# Patient Record
Sex: MALE | Race: WHITE | NOT HISPANIC OR LATINO | Employment: FULL TIME | URBAN - METROPOLITAN AREA
[De-identification: names, ages, dates, MRNs, and addresses within clinical notes are randomized per-mention and may not be internally consistent; named-entity substitution may affect disease eponyms.]

---

## 2018-02-24 ENCOUNTER — OFFICE VISIT (OUTPATIENT)
Dept: URGENT CARE | Facility: PHYSICIAN GROUP | Age: 53
End: 2018-02-24
Payer: COMMERCIAL

## 2018-02-24 ENCOUNTER — HOSPITAL ENCOUNTER (OUTPATIENT)
Facility: MEDICAL CENTER | Age: 53
End: 2018-02-24
Attending: NURSE PRACTITIONER
Payer: COMMERCIAL

## 2018-02-24 ENCOUNTER — HOSPITAL ENCOUNTER (OUTPATIENT)
Dept: LAB | Facility: MEDICAL CENTER | Age: 53
End: 2018-02-24
Attending: NURSE PRACTITIONER
Payer: COMMERCIAL

## 2018-02-24 VITALS
BODY MASS INDEX: 28.61 KG/M2 | HEART RATE: 94 BPM | WEIGHT: 178 LBS | DIASTOLIC BLOOD PRESSURE: 80 MMHG | RESPIRATION RATE: 14 BRPM | HEIGHT: 66 IN | SYSTOLIC BLOOD PRESSURE: 124 MMHG | OXYGEN SATURATION: 97 % | TEMPERATURE: 97.2 F

## 2018-02-24 DIAGNOSIS — R53.83 MALAISE AND FATIGUE: ICD-10-CM

## 2018-02-24 DIAGNOSIS — R53.81 MALAISE AND FATIGUE: ICD-10-CM

## 2018-02-24 DIAGNOSIS — R21 RASH: ICD-10-CM

## 2018-02-24 LAB
ALBUMIN SERPL BCP-MCNC: 4.8 G/DL (ref 3.2–4.9)
ALBUMIN/GLOB SERPL: 1.9 G/DL
ALP SERPL-CCNC: 38 U/L (ref 30–99)
ALT SERPL-CCNC: 27 U/L (ref 2–50)
ANION GAP SERPL CALC-SCNC: 6 MMOL/L (ref 0–11.9)
AST SERPL-CCNC: 18 U/L (ref 12–45)
BASOPHILS # BLD AUTO: 0.5 % (ref 0–1.8)
BASOPHILS # BLD: 0.04 K/UL (ref 0–0.12)
BILIRUB SERPL-MCNC: 0.5 MG/DL (ref 0.1–1.5)
BUN SERPL-MCNC: 12 MG/DL (ref 8–22)
CALCIUM SERPL-MCNC: 9.7 MG/DL (ref 8.5–10.5)
CHLORIDE SERPL-SCNC: 105 MMOL/L (ref 96–112)
CO2 SERPL-SCNC: 25 MMOL/L (ref 20–33)
CREAT SERPL-MCNC: 1.11 MG/DL (ref 0.5–1.4)
EOSINOPHIL # BLD AUTO: 0.2 K/UL (ref 0–0.51)
EOSINOPHIL NFR BLD: 2.7 % (ref 0–6.9)
ERYTHROCYTE [DISTWIDTH] IN BLOOD BY AUTOMATED COUNT: 43.8 FL (ref 35.9–50)
GLOBULIN SER CALC-MCNC: 2.5 G/DL (ref 1.9–3.5)
GLUCOSE SERPL-MCNC: 93 MG/DL (ref 65–99)
HCT VFR BLD AUTO: 47.6 % (ref 42–52)
HGB BLD-MCNC: 15.9 G/DL (ref 14–18)
IMM GRANULOCYTES # BLD AUTO: 0.02 K/UL (ref 0–0.11)
IMM GRANULOCYTES NFR BLD AUTO: 0.3 % (ref 0–0.9)
LYMPHOCYTES # BLD AUTO: 2.37 K/UL (ref 1–4.8)
LYMPHOCYTES NFR BLD: 32.1 % (ref 22–41)
MCH RBC QN AUTO: 30.8 PG (ref 27–33)
MCHC RBC AUTO-ENTMCNC: 33.4 G/DL (ref 33.7–35.3)
MCV RBC AUTO: 92.1 FL (ref 81.4–97.8)
MONOCYTES # BLD AUTO: 0.74 K/UL (ref 0–0.85)
MONOCYTES NFR BLD AUTO: 10 % (ref 0–13.4)
NEUTROPHILS # BLD AUTO: 4.02 K/UL (ref 1.82–7.42)
NEUTROPHILS NFR BLD: 54.4 % (ref 44–72)
NRBC # BLD AUTO: 0 K/UL
NRBC BLD-RTO: 0 /100 WBC
PLATELET # BLD AUTO: 210 K/UL (ref 164–446)
PMV BLD AUTO: 9.4 FL (ref 9–12.9)
POTASSIUM SERPL-SCNC: 4.1 MMOL/L (ref 3.6–5.5)
PROT SERPL-MCNC: 7.3 G/DL (ref 6–8.2)
RBC # BLD AUTO: 5.17 M/UL (ref 4.7–6.1)
SODIUM SERPL-SCNC: 136 MMOL/L (ref 135–145)
WBC # BLD AUTO: 7.4 K/UL (ref 4.8–10.8)

## 2018-02-24 PROCEDURE — 85025 COMPLETE CBC W/AUTO DIFF WBC: CPT

## 2018-02-24 PROCEDURE — 36415 COLL VENOUS BLD VENIPUNCTURE: CPT

## 2018-02-24 PROCEDURE — 86038 ANTINUCLEAR ANTIBODIES: CPT

## 2018-02-24 PROCEDURE — 80053 COMPREHEN METABOLIC PANEL: CPT

## 2018-02-24 PROCEDURE — 99204 OFFICE O/P NEW MOD 45 MIN: CPT | Performed by: NURSE PRACTITIONER

## 2018-02-24 PROCEDURE — 87070 CULTURE OTHR SPECIMN AEROBIC: CPT

## 2018-02-24 RX ORDER — TRIAMCINOLONE ACETONIDE 1 MG/G
1 OINTMENT TOPICAL 2 TIMES DAILY
Qty: 1 TUBE | Refills: 0 | Status: SHIPPED | OUTPATIENT
Start: 2018-02-24 | End: 2021-11-09

## 2018-02-24 NOTE — PROGRESS NOTES
"Chief Complaint   Patient presents with   • Rash     on both shoulder and back        HISTORY OF PRESENT ILLNESS: Patient is a 52 y.o. male who presents to urgent care today       Rash 3-4 weeks, worsening, burning and itching    Fatigue, joint pain for two weeks    No fever, chest pain,     Mild headache    No n/v/d, abd pain    No sore throat    Benadryl, helped    No recent travel        There are no active problems to display for this patient.      Allergies:Patient has no known allergies.    No current [facility]-ordered outpatient prescriptions on file.     No current [facility]-ordered facility-administered medications on file.        History reviewed. No pertinent past medical history.    Social History   Substance Use Topics   • Smoking status: Never Smoker   • Smokeless tobacco: Never Used   • Alcohol use 3.0 oz/week     5 Cans of beer per week      Comment: week       No family status information on file.   History reviewed. No pertinent family history.    ROS:  Review of Systems   Constitutional: Negative for fever, chills, weight loss, malaise, and fatigue.   HENT: Negative for ear pain, nosebleeds, congestion, sore throat and neck pain.    Eyes: Negative for vision changes.   Neuro: Negative for headache, sensory changes, weakness, seizure, LOC.   Cardiovascular: Negative for chest pain, palpitations, orthopnea and leg swelling.   Respiratory: Negative for cough, sputum production, shortness of breath and wheezing.   Gastrointestinal: Negative for abdominal pain, nausea, vomiting or diarrhea.   Genitourinary: Negative for dysuria, urgency and frequency.  Musculoskeletal: Negative for falls, neck pain, back pain, joint pain, myalgias.   Skin: Negative for rash, diaphoresis.     Exam:  Blood pressure 124/80, pulse 94, temperature 36.2 °C (97.2 °F), resp. rate 14, height 1.676 m (5' 6\"), weight 80.7 kg (178 lb), SpO2 97 %.  General: well-nourished, well-developed male in NAD  Head: normocephalic, atraumatic  Eyes: " PERRLA, no conjunctival injection, acuity grossly intact, lids normal.  Ears: normal shape and symmetry, no tenderness, no discharge. External canals are without any significant edema or erythema. Tympanic membranes are without any inflammation, no effusion. Gross auditory acuity is intact.  Nose: symmetrical without tenderness, no discharge.  Mouth/Throat: reasonable hygiene, no erythema, exudates or tonsillar enlargement.  Neck: no masses, range of motion within normal limits, no tracheal deviation. No obvious thyroid enlargement.   Lymph: no cervical adenopathy. No supraclavicular adenopathy.   Neuro: alert and oriented. Cranial nerves 1-12 grossly intact. No sensory deficit.   Cardiovascular: regular rate and rhythm. No edema.  Pulmonary: no distress. Chest is symmetrical with respiration, no wheezes, crackles, or rhonchi.   Abdomen: soft, non-tender, no guarding, no hepatosplenomegaly.  Musculoskeletal: no clubbing, appropriate muscle tone, gait is stable.  Skin: warm, dry, intact, no clubbing, no cyanosis, no rashes.   Psych: appropriate mood, affect, judgement.         Assessment/Plan:  No diagnosis found.      Supportive care, differential diagnoses, and indications for immediate follow-up discussed with patient.   Pathogenesis of diagnosis discussed including typical length and natural progression.   Instructed to return to clinic or nearest emergency department for any change in condition, further concerns, or worsening of symptoms.  Patient states understanding of the plan of care and discharge instructions.  Instructed to make an appointment, for follow up, with their primary care provider.        Please note that this dictation was created using voice recognition software. I have made every reasonable attempt to correct obvious errors, but I expect that there are errors of grammar and possibly content that I did not discover before finalizing the note.      SHYLA Perry.

## 2018-02-25 NOTE — PROGRESS NOTES
Chief Complaint   Patient presents with   • Rash     on both shoulder and back        HISTORY OF PRESENT ILLNESS: Patient is a 52 y.o. male who presents to urgent care today with complaints of a rash. States he first noticed the rash four weeks ago and has been slowly spreading. The rash is located on his back and shoulders. Described as burning and pruritic. He admits to generalized malaise, fatigue, mild headache, and joint pain for the past two weeks. States he otherwise feels well and denies cough, abdominal pain, fever, chest pain, changes in urination, shortness of breath, nausea, diarrhea, sore throat, runny nose, ear pain, congestion. He lives at home with his son who is asymptomatic. He denies recent travel, changes in medication. Denies previous history of the same. He has tried benadryl which has helped with itch relief.       There are no active problems to display for this patient.      Allergies:Patient has no known allergies.    No current [facility]-ordered outpatient prescriptions on file.     No current [facility]-ordered facility-administered medications on file.        History reviewed. No pertinent past medical history.    Social History   Substance Use Topics   • Smoking status: Never Smoker   • Smokeless tobacco: Never Used   • Alcohol use 3.0 oz/week     5 Cans of beer per week      Comment: week       No family status information on file.   History reviewed. No pertinent family history.    ROS:  Review of Systems   Constitutional: Positive for malaise, fatigue. Negative for fever, chills, weight loss.   HENT: Negative for ear pain, nosebleeds, congestion, sore throat and neck pain.    Eyes: Negative for vision changes.   Neuro: Positive for mild headache. Negative for sensory changes, weakness, seizure, LOC.   Cardiovascular: Negative for chest pain, palpitations, orthopnea and leg swelling.   Respiratory: Negative for cough, sputum production, shortness of breath and wheezing.   Gastrointestinal:  "Negative for abdominal pain, nausea, vomiting or diarrhea.   Genitourinary: Negative for dysuria, urgency and frequency.  Musculoskeletal: Positive for joint pain, myalgias. Negative for falls, neck pain, back pain.   Skin: Positive for rash. Negative for diaphoresis.     Exam:  Blood pressure 124/80, pulse 94, temperature 36.2 °C (97.2 °F), resp. rate 14, height 1.676 m (5' 6\"), weight 80.7 kg (178 lb), SpO2 97 %.  General: well-nourished, well-developed male in NAD  Head: normocephalic, atraumatic  Eyes: PERRLA, no conjunctival injection, acuity grossly intact, lids normal.  Ears: normal shape and symmetry, no tenderness, no discharge. External canals are without any significant edema or erythema. Tympanic membranes are without any inflammation, no effusion. Gross auditory acuity is intact.  Nose: symmetrical without tenderness, no discharge.  Mouth/Throat: reasonable hygiene, no erythema, exudates or tonsillar enlargement.  Neck: no masses, range of motion within normal limits, no tracheal deviation. No obvious thyroid enlargement.   Lymph: no cervical adenopathy. No supraclavicular adenopathy.   Neuro: alert and oriented. Cranial nerves 1-12 grossly intact. No sensory deficit.   Cardiovascular: regular rate and rhythm. No edema.  Pulmonary: no distress. Chest is symmetrical with respiration, no wheezes, crackles, or rhonchi.   Musculoskeletal: no clubbing, appropriate muscle tone, gait is stable.  Skin: warm, dry, intact, no clubbing, no cyanosis. There are approximately 20 small, approx 2mm to 0.5cm, circular and oval shaped plaque and scaly flesh colored lesions to back and shoulders.   Psych: appropriate mood, affect, judgement.         Assessment/Plan:  1. Rash  CBC WITH DIFFERENTIAL    COMP METABOLIC PANEL    ANTI-NUCLEAR ANTIBODY SERUM    URINE CULTURE(NEW)    CHLAMYDIA/GC PCR URINE OR SWAB    triamcinolone acetonide (KENALOG) 0.1 % Ointment    CULTURE THROAT   2. Malaise and fatigue  CBC WITH DIFFERENTIAL "    COMP METABOLIC PANEL    ANTI-NUCLEAR ANTIBODY SERUM    URINE CULTURE(NEW)    CHLAMYDIA/GC PCR URINE OR SWAB    CULTURE THROAT         Kenalog for symptom relief of rash. Will run a CBC, CMP, JATIN, urine and throat culture, and STD screening due to his associated malaise, fatigue, and joint pain.   Supportive care, differential diagnoses, and indications for immediate follow-up discussed with patient.   Pathogenesis of diagnosis discussed including typical length and natural progression.   Instructed to return to clinic or nearest emergency department for any change in condition, further concerns, or worsening of symptoms.  Patient states understanding of the plan of care and discharge instructions.  Instructed to make an appointment, for follow up, with his primary care provider.        Please note that this dictation was created using voice recognition software. I have made every reasonable attempt to correct obvious errors, but I expect that there are errors of grammar and possibly content that I did not discover before finalizing the note.      SHYLA Perry.

## 2018-02-26 ENCOUNTER — HOSPITAL ENCOUNTER (OUTPATIENT)
Facility: MEDICAL CENTER | Age: 53
End: 2018-02-26
Attending: NURSE PRACTITIONER
Payer: COMMERCIAL

## 2018-02-26 LAB
BACTERIA SPEC RESP CULT: NORMAL
SIGNIFICANT IND 70042: NORMAL
SITE SITE: NORMAL
SOURCE SOURCE: NORMAL

## 2018-02-26 PROCEDURE — 87591 N.GONORRHOEAE DNA AMP PROB: CPT

## 2018-02-26 PROCEDURE — 87491 CHLMYD TRACH DNA AMP PROBE: CPT

## 2018-02-26 PROCEDURE — 87086 URINE CULTURE/COLONY COUNT: CPT

## 2018-02-27 LAB — NUCLEAR IGG SER QL IA: NORMAL

## 2018-02-28 LAB
BACTERIA UR CULT: NORMAL
SIGNIFICANT IND 70042: NORMAL
SITE SITE: NORMAL
SOURCE SOURCE: NORMAL

## 2018-03-01 LAB
C TRACH DNA SPEC QL NAA+PROBE: NEGATIVE
N GONORRHOEA DNA SPEC QL NAA+PROBE: NEGATIVE
SPECIMEN SOURCE: NORMAL

## 2018-03-28 ENCOUNTER — OFFICE VISIT (OUTPATIENT)
Dept: MEDICAL GROUP | Facility: PHYSICIAN GROUP | Age: 53
End: 2018-03-28
Payer: COMMERCIAL

## 2018-03-28 VITALS
HEIGHT: 66 IN | DIASTOLIC BLOOD PRESSURE: 80 MMHG | OXYGEN SATURATION: 93 % | WEIGHT: 192 LBS | SYSTOLIC BLOOD PRESSURE: 116 MMHG | RESPIRATION RATE: 14 BRPM | HEART RATE: 90 BPM | TEMPERATURE: 99.5 F | BODY MASS INDEX: 30.86 KG/M2

## 2018-03-28 DIAGNOSIS — M89.242: ICD-10-CM

## 2018-03-28 DIAGNOSIS — Z88.9 H/O SEASONAL ALLERGIES: ICD-10-CM

## 2018-03-28 DIAGNOSIS — R21 RASH: ICD-10-CM

## 2018-03-28 DIAGNOSIS — Z12.11 SCREEN FOR COLON CANCER: ICD-10-CM

## 2018-03-28 PROCEDURE — 99214 OFFICE O/P EST MOD 30 MIN: CPT | Performed by: NURSE PRACTITIONER

## 2018-03-28 ASSESSMENT — PATIENT HEALTH QUESTIONNAIRE - PHQ9: CLINICAL INTERPRETATION OF PHQ2 SCORE: 0

## 2018-03-29 NOTE — ASSESSMENT & PLAN NOTE
Patient reports a growth on 4th finger knuckle after hitting is hard several months ago.  Sometimes painful, non-moveable.  Would like an xray.  CSM to fingers intact.

## 2018-03-29 NOTE — ASSESSMENT & PLAN NOTE
Reports ongoing eczema when stressed.  Using kenalog ointment with good results.  Currently has a small outbreak on inner arms.

## 2018-03-29 NOTE — PROGRESS NOTES
"Chief Complaint   Patient presents with   • Establish Care   • Finger Pain     lump on right ring finger    • Rash       Subjective:   Jack Singh is a 52 y.o. male here to establish care  Today and for evaluation and management of:    Rash  Reports ongoing eczema when stressed.  Using kenalog ointment with good results.  Currently has a small outbreak on inner arms.      H/O seasonal allergies  States his allergies have responded well to flonase nasal spray.  Currently using daily.     Other disorders of bone development and growth, left hand  Patient reports a growth on 4th finger knuckle after hitting is hard several months ago.  Sometimes painful, non-moveable.  Would like an xray.  CSM to fingers intact.            Current medicines (including changes today)  Current Outpatient Prescriptions   Medication Sig Dispense Refill   • triamcinolone acetonide (KENALOG) 0.1 % Ointment Apply 1 Application to affected area(s) 2 times a day. 1 Tube 0     No current facility-administered medications for this visit.      He  has a past medical history of Allergy and Eczema.    ROS as stated in hpi  No chest pain, no shortness of breath, no abdominal pain       Objective:     Blood pressure 116/80, pulse 90, temperature 37.5 °C (99.5 °F), resp. rate 14, height 1.676 m (5' 6\"), weight 87.1 kg (192 lb), SpO2 93 %. Body mass index is 30.99 kg/m².   Physical Exam:  Constitutional: Alert, no distress.  Skin: Warm, dry, good turgor,no cyanosis,small eczematous rash noted on inner right arm.   Eye: Equal, round and reactive, conjunctiva clear, lids normal.  Ears: No tenderness, no discharge.  External canals are without any significant edema or erythema. Gross auditory acuity is intact.  Nose: symmetrical without tenderness, no discharge.  Mouth/Throat: lips without lesion.  Oropharynx clear.    Neck: Trachea midline, no masses, no obvious thyroid enlargement.. No cervical or supraclavicular lymphadenopathy. Range of motion " within normal limits.  Neuro: Cranial nerves 2-12 grossly intact.  No sensory deficit.  Respiratory: Unlabored respiratory effort, lungs clear to auscultation, no wheezes, no ronchi.  Cardiovascular: Normal S1, S2, no murmur, no edema.  MSK:  Right 4th finger pip joint with firm nodule noted.  No redness, slightly tender to palpation.   Psych: Alert and oriented x3, normal affect and mood and judgement.        Assessment and Plan:   The following treatment plan was discussed    1. Rash  This is anew problem to me.  Chronic.  Ongoing.  Stable.  Continue with Triamcinolone ointment prn.  Monitor and follow.     2. H/O seasonal allergies  This is a new problem to me.  Chronic. Ongoing. Stable.  Flonase daily as needed.      3. Screen for colon cancer  Due for colon cancer screening.  Referral placed.  Monitor results.   - REFERRAL TO GI FOR COLONOSCOPY    4. Other disorders of bone development and growth, left hand  This is a new problem to me.  Acute.  Unknown etiology, may represent a scar tissue formation after trauma to the area.  Xray ordered.  Monitor results.  - DX-HAND 2- RIGHT; Future      Followup: Return in about 1 year (around 3/28/2019) for Annual.

## 2018-03-31 ENCOUNTER — HOSPITAL ENCOUNTER (OUTPATIENT)
Dept: RADIOLOGY | Facility: MEDICAL CENTER | Age: 53
End: 2018-03-31
Attending: NURSE PRACTITIONER
Payer: COMMERCIAL

## 2018-03-31 DIAGNOSIS — M89.242: ICD-10-CM

## 2018-03-31 PROCEDURE — 73130 X-RAY EXAM OF HAND: CPT | Mod: RT

## 2018-04-02 ENCOUNTER — TELEPHONE (OUTPATIENT)
Dept: MEDICAL GROUP | Facility: PHYSICIAN GROUP | Age: 53
End: 2018-04-02

## 2018-04-02 NOTE — TELEPHONE ENCOUNTER
----- Message from ZACH Medley sent at 4/2/2018  9:11 AM PDT -----  Please let patient know that the right hand xray was negative for any bony/joint abnormality.  No soft tissue abnormality was noted.  ZACH Medley

## 2018-04-02 NOTE — LETTER
April 2, 2018         Jack Singh  [address] [place] [address]        Dear Jack CAPONE:      Below are the results from your recent visit:    Please let patient know that the right hand xray was negative for any bony/joint abnormality.  No soft tissue abnormality was noted.  ZACH Medley    Resulted Orders   DX-HAND 3+ RIGHT    Narrative    3/31/2018 9:35 AM    HISTORY/REASON FOR EXAM:  Right hand pain in the 4th PIP joint region..      TECHNIQUE/EXAM DESCRIPTION AND NUMBER OF VIEWS:  3 views of the RIGHT hand.    COMPARISON: None    FINDINGS:  No fracture, dislocation, or acute joint abnormality is present.  No osteophytic spurring or area of exostosis is identified in the region of the 4th PIP joint.  No soft tissue abnormality is identified.      Impression    Negative RIGHT hand series.     If you have any questions or concerns, please don't hesitate to call.    Electronically Signed

## 2019-02-14 ENCOUNTER — OFFICE VISIT (OUTPATIENT)
Dept: MEDICAL GROUP | Facility: PHYSICIAN GROUP | Age: 54
End: 2019-02-14
Payer: COMMERCIAL

## 2019-02-14 VITALS
OXYGEN SATURATION: 96 % | HEART RATE: 71 BPM | WEIGHT: 188 LBS | BODY MASS INDEX: 30.22 KG/M2 | HEIGHT: 66 IN | SYSTOLIC BLOOD PRESSURE: 116 MMHG | TEMPERATURE: 98.2 F | DIASTOLIC BLOOD PRESSURE: 70 MMHG

## 2019-02-14 DIAGNOSIS — K43.9 VENTRAL HERNIA WITHOUT OBSTRUCTION OR GANGRENE: ICD-10-CM

## 2019-02-14 DIAGNOSIS — Z00.00 PREVENTATIVE HEALTH CARE: ICD-10-CM

## 2019-02-14 DIAGNOSIS — M25.511 CHRONIC RIGHT SHOULDER PAIN: ICD-10-CM

## 2019-02-14 DIAGNOSIS — G89.29 CHRONIC RIGHT SHOULDER PAIN: ICD-10-CM

## 2019-02-14 PROBLEM — R21 RASH: Status: RESOLVED | Noted: 2018-03-28 | Resolved: 2019-02-14

## 2019-02-14 PROCEDURE — 99386 PREV VISIT NEW AGE 40-64: CPT | Performed by: NURSE PRACTITIONER

## 2019-02-14 ASSESSMENT — PATIENT HEALTH QUESTIONNAIRE - PHQ9: CLINICAL INTERPRETATION OF PHQ2 SCORE: 0

## 2019-02-15 ENCOUNTER — HOSPITAL ENCOUNTER (OUTPATIENT)
Dept: LAB | Facility: MEDICAL CENTER | Age: 54
End: 2019-02-15
Attending: NURSE PRACTITIONER
Payer: COMMERCIAL

## 2019-02-15 ENCOUNTER — HOSPITAL ENCOUNTER (OUTPATIENT)
Dept: RADIOLOGY | Facility: MEDICAL CENTER | Age: 54
End: 2019-02-15
Attending: NURSE PRACTITIONER
Payer: COMMERCIAL

## 2019-02-15 DIAGNOSIS — Z00.00 PREVENTATIVE HEALTH CARE: ICD-10-CM

## 2019-02-15 DIAGNOSIS — M25.511 CHRONIC RIGHT SHOULDER PAIN: ICD-10-CM

## 2019-02-15 DIAGNOSIS — G89.29 CHRONIC RIGHT SHOULDER PAIN: ICD-10-CM

## 2019-02-15 LAB
ALBUMIN SERPL BCP-MCNC: 4.7 G/DL (ref 3.2–4.9)
ALBUMIN/GLOB SERPL: 1.6 G/DL
ALP SERPL-CCNC: 39 U/L (ref 30–99)
ALT SERPL-CCNC: 22 U/L (ref 2–50)
ANION GAP SERPL CALC-SCNC: 6 MMOL/L (ref 0–11.9)
AST SERPL-CCNC: 17 U/L (ref 12–45)
BASOPHILS # BLD AUTO: 0.8 % (ref 0–1.8)
BASOPHILS # BLD: 0.06 K/UL (ref 0–0.12)
BILIRUB SERPL-MCNC: 0.4 MG/DL (ref 0.1–1.5)
BUN SERPL-MCNC: 15 MG/DL (ref 8–22)
CALCIUM SERPL-MCNC: 9.7 MG/DL (ref 8.5–10.5)
CHLORIDE SERPL-SCNC: 102 MMOL/L (ref 96–112)
CHOLEST SERPL-MCNC: 193 MG/DL (ref 100–199)
CO2 SERPL-SCNC: 28 MMOL/L (ref 20–33)
CREAT SERPL-MCNC: 1.08 MG/DL (ref 0.5–1.4)
EOSINOPHIL # BLD AUTO: 0.16 K/UL (ref 0–0.51)
EOSINOPHIL NFR BLD: 2.1 % (ref 0–6.9)
ERYTHROCYTE [DISTWIDTH] IN BLOOD BY AUTOMATED COUNT: 47.3 FL (ref 35.9–50)
GLOBULIN SER CALC-MCNC: 3 G/DL (ref 1.9–3.5)
GLUCOSE SERPL-MCNC: 92 MG/DL (ref 65–99)
HCT VFR BLD AUTO: 49.4 % (ref 42–52)
HDLC SERPL-MCNC: 47 MG/DL
HGB BLD-MCNC: 16 G/DL (ref 14–18)
IMM GRANULOCYTES # BLD AUTO: 0.03 K/UL (ref 0–0.11)
IMM GRANULOCYTES NFR BLD AUTO: 0.4 % (ref 0–0.9)
LDLC SERPL CALC-MCNC: 115 MG/DL
LYMPHOCYTES # BLD AUTO: 2.37 K/UL (ref 1–4.8)
LYMPHOCYTES NFR BLD: 31.1 % (ref 22–41)
MCH RBC QN AUTO: 31 PG (ref 27–33)
MCHC RBC AUTO-ENTMCNC: 32.4 G/DL (ref 33.7–35.3)
MCV RBC AUTO: 95.7 FL (ref 81.4–97.8)
MONOCYTES # BLD AUTO: 0.63 K/UL (ref 0–0.85)
MONOCYTES NFR BLD AUTO: 8.3 % (ref 0–13.4)
NEUTROPHILS # BLD AUTO: 4.37 K/UL (ref 1.82–7.42)
NEUTROPHILS NFR BLD: 57.3 % (ref 44–72)
NRBC # BLD AUTO: 0 K/UL
NRBC BLD-RTO: 0 /100 WBC
PLATELET # BLD AUTO: 234 K/UL (ref 164–446)
PMV BLD AUTO: 9.3 FL (ref 9–12.9)
POTASSIUM SERPL-SCNC: 4.3 MMOL/L (ref 3.6–5.5)
PROT SERPL-MCNC: 7.7 G/DL (ref 6–8.2)
RBC # BLD AUTO: 5.16 M/UL (ref 4.7–6.1)
SODIUM SERPL-SCNC: 136 MMOL/L (ref 135–145)
TRIGL SERPL-MCNC: 153 MG/DL (ref 0–149)
WBC # BLD AUTO: 7.6 K/UL (ref 4.8–10.8)

## 2019-02-15 PROCEDURE — 80053 COMPREHEN METABOLIC PANEL: CPT

## 2019-02-15 PROCEDURE — 36415 COLL VENOUS BLD VENIPUNCTURE: CPT

## 2019-02-15 PROCEDURE — 80061 LIPID PANEL: CPT

## 2019-02-15 PROCEDURE — 73030 X-RAY EXAM OF SHOULDER: CPT | Mod: RT

## 2019-02-15 PROCEDURE — 85025 COMPLETE CBC W/AUTO DIFF WBC: CPT

## 2019-02-15 NOTE — PROGRESS NOTES
"Chief Complaint   Patient presents with   • Annual Exam   • Shoulder Pain     Right       Subjective:   Jack Singh is a 53 y.o. male here today for preventative exam    Chronic right shoulder pain  Reports increasing pain in right shoulder over the past several months.  Worsens with cold.  Rom causes pain with abduction. Hurts to sleep on that side.  Dominant arm. Has used icy hot and ibuprofen with some iimprovement.     Ventral hernia without obstruction or gangrene  This is anew reported problem.  States it has been there for many years.  Requesting referral for evaluation of this hernia.  Only painful with heavy lifting.          Current medicines (including changes today)  Current Outpatient Prescriptions   Medication Sig Dispense Refill   • triamcinolone acetonide (KENALOG) 0.1 % Ointment Apply 1 Application to affected area(s) 2 times a day. (Patient not taking: Reported on 2/14/2019) 1 Tube 0     No current facility-administered medications for this visit.      He  has a past medical history of Allergy and Eczema.    ROS as stated in hpi  No chest pain, no shortness of breath, no abdominal pain       Objective:     Blood pressure 116/70, pulse 71, temperature 36.8 °C (98.2 °F), temperature source Temporal, height 1.676 m (5' 6\"), weight 85.3 kg (188 lb), SpO2 96 %. Body mass index is 30.34 kg/m².   Physical Exam:  Constitutional: Alert, no distress.  Skin: Warm, dry, good turgor,no cyanosis, no rashes in visible areas.  Eye: Equal, round and reactive, conjunctiva clear, lids normal.  Ears: No tenderness, no discharge.  External canals are without any significant edema or erythema.  Tympanic membranes are without any inflammation, no effusion.  Gross auditory acuity is intact.  Nose: symmetrical without tenderness, no discharge.  Mouth/Throat: lips without lesion.  Oropharynx clear.  Throat without erythema, exudates or tonsillar enlargement.  Neck: Trachea midline, no masses, no obvious thyroid " enlargement.. No cervical or supraclavicular lymphadenopathy. Range of motion within normal limits.  Neuro: Cranial nerves 2-12 grossly intact.  No sensory deficit.  Respiratory: Unlabored respiratory effort, lungs clear to auscultation, no wheezes, no ronchi.  Cardiovascular: Normal S1, S2, no murmur, no edema.  Abdomen: Soft, non-tender, reducible ventral hernia midline, no redness or pain with palpation. , no guarding,  no hepatosplenomegaly.  MSK:  Right shoulder with pain in posterior aspect of joint. No swelling brusing or deformity noted.  Negative tin can sign.  Decrease abduction ROM  Psych: Alert and oriented x3, normal affect and mood and judgement.        Assessment and Plan:   The following treatment plan was discussed    1. Chronic right shoulder pain  This is a new problem to me.  Chronic, ongoing, worsening with winter weather.  Suspect arthritis.  Will obtain Xray.  Discussed possible shoulder injection, physical therapy or referral to orthopedics depending on x-ray findings.  Continue with heat ice ibuprofen and icy hot.  Monitor and follow results.  - DX-SHOULDER 2+ RIGHT; Future    2. Ventral hernia without obstruction or gangrene  This is a new problem to me.  Chronic ongoing issue.  Patient would like a referral to general surgery for consultation.  On exam the hernia is stable at this time.  Referral placed.  Monitor and follow.  - REFERRAL TO GENERAL SURGERY    3. Preventative health care  Here for annual preventative exam.  Due for lab work.  Orders provided.  Monitor and follow.  Patient is up-to-date on colonoscopy.  He is not interested in any vaccines today including influenza.  - CBC WITH DIFFERENTIAL; Future  - Comp Metabolic Panel; Future  - Lipid Profile; Future      Followup: Return in about 1 year (around 2/14/2020), or if symptoms worsen or fail to improve.         Educated in proper administration of medication(s) ordered today including safety, possible SE, risks, benefits,  rationale and alternatives to therapy.     Please note that this dictation was created using voice recognition software. I have made every reasonable attempt to correct obvious errors, but I expect that there are errors of grammar and possibly content that I did not discover before finalizing the note.

## 2019-02-15 NOTE — ASSESSMENT & PLAN NOTE
Reports increasing pain in right shoulder over the past several months.  Worsens with cold.  Rom causes pain with abduction. Hurts to sleep on that side.  Dominant arm. Has used icy hot and ibuprofen with some iimprovement.

## 2019-02-15 NOTE — ASSESSMENT & PLAN NOTE
This is anew reported problem.  States it has been there for many years.  Requesting referral for evaluation of this hernia.  Only painful with heavy lifting.

## 2019-02-19 ENCOUNTER — TELEPHONE (OUTPATIENT)
Dept: MEDICAL GROUP | Facility: PHYSICIAN GROUP | Age: 54
End: 2019-02-19

## 2019-02-19 NOTE — TELEPHONE ENCOUNTER
----- Message from ZACH Medley sent at 2/19/2019  9:06 AM PST -----  Jack  Labs are back.  Kidney, liver and blood counts all in the normal range.  Cholesterol is normal.  Triglycerides are just slightly elevated.  These are the fatty sugars in our bloodstream.  Watching sugars, treats and fast foods will help bring this down.  Adding more veggies to our diet is also a good idea!.  All in all, things look good.  ZACH Medley

## 2019-02-19 NOTE — LETTER
February 19, 2019         Jack Singh  [address] [place] [address]        Dear Jack CAPONE:      Below are the results from your recent visit:    Labs are back.  Kidney, liver and blood counts all in the normal range.  Cholesterol is normal.  Triglycerides are just slightly elevated.  These are the fatty sugars in our bloodstream.  Watching sugars, treats and fast foods will help bring this down.  Adding more veggies to our diet is also a good idea!.  All in all, things look good.   ZACH Medley     Resulted Orders   CBC WITH DIFFERENTIAL   Result Value Ref Range    WBC 7.6 4.8 - 10.8 K/uL    RBC 5.16 4.70 - 6.10 M/uL    Hemoglobin 16.0 14.0 - 18.0 g/dL    Hematocrit 49.4 42.0 - 52.0 %    MCV 95.7 81.4 - 97.8 fL    MCH 31.0 27.0 - 33.0 pg    MCHC 32.4 (L) 33.7 - 35.3 g/dL    RDW 47.3 35.9 - 50.0 fL    Platelet Count 234 164 - 446 K/uL    MPV 9.3 9.0 - 12.9 fL    Neutrophils-Polys 57.30 44.00 - 72.00 %    Lymphocytes 31.10 22.00 - 41.00 %    Monocytes 8.30 0.00 - 13.40 %    Eosinophils 2.10 0.00 - 6.90 %    Basophils 0.80 0.00 - 1.80 %    Immature Granulocytes 0.40 0.00 - 0.90 %    Nucleated RBC 0.00 /100 WBC    Neutrophils (Absolute) 4.37 1.82 - 7.42 K/uL      Comment:      Includes immature neutrophils, if present.    Lymphs (Absolute) 2.37 1.00 - 4.80 K/uL    Monos (Absolute) 0.63 0.00 - 0.85 K/uL    Eos (Absolute) 0.16 0.00 - 0.51 K/uL    Baso (Absolute) 0.06 0.00 - 0.12 K/uL    Immature Granulocytes (abs) 0.03 0.00 - 0.11 K/uL    NRBC (Absolute) 0.00 K/uL    Narrative    Fast 8-10 hours, OK to drink water as needed during fast,  take medications per your provider's instructions.   Comp Metabolic Panel   Result Value Ref Range    Sodium 136 135 - 145 mmol/L    Potassium 4.3 3.6 - 5.5 mmol/L    Chloride 102 96 - 112 mmol/L    Co2 28 20 - 33 mmol/L    Anion Gap 6.0 0.0 - 11.9    Glucose 92 65 - 99 mg/dL    Bun 15 8 - 22 mg/dL    Creatinine 1.08 0.50 - 1.40 mg/dL    Calcium 9.7 8.5 - 10.5 mg/dL    AST(SGOT) 17 12 - 45 U/L    ALT(SGPT) 22 2 - 50 U/L    Alkaline Phosphatase 39 30 - 99 U/L    Total Bilirubin 0.4 0.1 - 1.5 mg/dL    Albumin 4.7 3.2 - 4.9 g/dL    Total Protein 7.7 6.0 - 8.2 g/dL    Globulin 3.0 1.9 - 3.5 g/dL    A-G Ratio 1.6 g/dL    Narrative    Fast 8-10 hours, OK to drink water as needed during fast,  take medications per your provider's instructions.   Lipid Profile   Result Value Ref Range    Cholesterol,Tot 193 100 - 199 mg/dL    Triglycerides 153 (H) 0 - 149 mg/dL    HDL 47 >=40 mg/dL     (H) <100 mg/dL    Narrative    Fast 8-10 hours, OK to drink water as needed during fast,  take medications per your provider's instructions.   ESTIMATED GFR   Result Value Ref Range    GFR If African American >60 >60 mL/min/1.73 m 2    GFR If Non African American >60 >60 mL/min/1.73 m 2    Narrative    Fast 8-10 hours, OK to drink water as needed during fast,  take medications per your provider's instructions.     If you have any questions or concerns, please don't hesitate to call.    Electronically Signed

## 2019-12-11 ENCOUNTER — NON-PROVIDER VISIT (OUTPATIENT)
Dept: MEDICAL GROUP | Facility: PHYSICIAN GROUP | Age: 54
End: 2019-12-11
Payer: COMMERCIAL

## 2019-12-11 DIAGNOSIS — Z23 NEED FOR VACCINATION: ICD-10-CM

## 2019-12-11 PROCEDURE — 90686 IIV4 VACC NO PRSV 0.5 ML IM: CPT | Performed by: NURSE PRACTITIONER

## 2019-12-11 PROCEDURE — 90715 TDAP VACCINE 7 YRS/> IM: CPT | Performed by: NURSE PRACTITIONER

## 2019-12-11 PROCEDURE — 90472 IMMUNIZATION ADMIN EACH ADD: CPT | Performed by: NURSE PRACTITIONER

## 2019-12-11 PROCEDURE — 90471 IMMUNIZATION ADMIN: CPT | Performed by: NURSE PRACTITIONER

## 2019-12-12 NOTE — PROGRESS NOTES
"Jack Singh is a 54 y.o. male here for a non-provider visit for:   FLU    Reason for immunization: Annual Flu Vaccine  Immunization records indicate need for vaccine: Yes, confirmed with Epic  Minimum interval has been met for this vaccine: Yes  ABN completed: Not Indicated    Order and dose verified by: JANA  VIS Dated  8/15/19 was given to patient: Yes  All IAC Questionnaire questions were answered \"No.\"    Patient tolerated injection and no adverse effects were observed or reported: Yes    Pt scheduled for next dose in series: Not Indicated    Jack Singh is a 54 y.o. male here for a non-provider visit for:   TDAP    Reason for immunization: Overdue/Provider Recommended  Immunization records indicate need for vaccine: Yes, confirmed with Epic  Minimum interval has been met for this vaccine: Yes  ABN completed: Not Indicated    Order and dose verified by: JANA  VIS Dated  2/24/2015 was given to patient: Yes  All IAC Questionnaire questions were answered \"No.\"    Patient tolerated injection and no adverse effects were observed or reported: Yes    Pt scheduled for next dose in series: Not Indicated  "

## 2020-11-11 ENCOUNTER — NON-PROVIDER VISIT (OUTPATIENT)
Dept: URGENT CARE | Facility: PHYSICIAN GROUP | Age: 55
End: 2020-11-11
Payer: COMMERCIAL

## 2020-11-11 DIAGNOSIS — Z23 NEED FOR VACCINATION: ICD-10-CM

## 2020-11-11 PROCEDURE — 90471 IMMUNIZATION ADMIN: CPT | Performed by: FAMILY MEDICINE

## 2020-11-11 PROCEDURE — 90686 IIV4 VACC NO PRSV 0.5 ML IM: CPT | Performed by: FAMILY MEDICINE

## 2021-11-09 ENCOUNTER — HOSPITAL ENCOUNTER (EMERGENCY)
Facility: MEDICAL CENTER | Age: 56
End: 2021-11-09
Attending: EMERGENCY MEDICINE | Admitting: EMERGENCY MEDICINE
Payer: COMMERCIAL

## 2021-11-09 ENCOUNTER — APPOINTMENT (OUTPATIENT)
Dept: RADIOLOGY | Facility: MEDICAL CENTER | Age: 56
End: 2021-11-09
Attending: EMERGENCY MEDICINE
Payer: COMMERCIAL

## 2021-11-09 VITALS
RESPIRATION RATE: 15 BRPM | WEIGHT: 191.8 LBS | DIASTOLIC BLOOD PRESSURE: 70 MMHG | TEMPERATURE: 98 F | SYSTOLIC BLOOD PRESSURE: 130 MMHG | OXYGEN SATURATION: 95 % | BODY MASS INDEX: 30.96 KG/M2 | HEART RATE: 85 BPM

## 2021-11-09 DIAGNOSIS — M79.89 LEG SWELLING: ICD-10-CM

## 2021-11-09 PROCEDURE — 93971 EXTREMITY STUDY: CPT | Mod: RT

## 2021-11-09 PROCEDURE — 99283 EMERGENCY DEPT VISIT LOW MDM: CPT

## 2021-11-09 PROCEDURE — 93971 EXTREMITY STUDY: CPT | Mod: 26,RT | Performed by: INTERNAL MEDICINE

## 2021-11-09 ASSESSMENT — LIFESTYLE VARIABLES: DO YOU DRINK ALCOHOL: NO

## 2021-11-09 NOTE — ED PROVIDER NOTES
ED Provider Note    CHIEF COMPLAINT  Chief Complaint   Patient presents with   • Leg Swelling     right        HPI  Jack Singh is a 56 y.o. male who presents stating that he has no significant past medical history, he does state however, that he had a right lower extremity DVT previously.  However, he reports that he was only on blood thinners while he was in the hospital and did not take any blood thinner after being discharged.  The patient reports leg pain and swelling since last night.  He denies any trauma to the leg.  He denies any chest pain or shortness of breath.  He describes the pain is moderate.  Patient denies having previous Covid infection.    REVIEW OF SYSTEMS  See HPI for further details. All other systems are negative.     PAST MEDICAL HISTORY   has a past medical history of Allergy and Eczema.    SOCIAL HISTORY  Social History     Tobacco Use   • Smoking status: Never Smoker   • Smokeless tobacco: Never Used   Substance and Sexual Activity   • Alcohol use: Yes     Alcohol/week: 6.0 - 7.2 oz     Types: 10 - 12 Cans of beer per week     Comment: week   • Drug use: No   • Sexual activity: Yes     Partners: Female       SURGICAL HISTORY  patient denies any surgical history    CURRENT MEDICATIONS  Home Medications     Reviewed by Noa Chavarria R.N. (Registered Nurse) on 11/09/21 at 0843  Med List Status: Not Addressed   Medication Last Dose Status        Patient Tyler Taking any Medications                       ALLERGIES  No Known Allergies    FAMILY HISTORY  No pertinent family history    PHYSICAL EXAM  VITAL SIGNS: /89   Pulse 93   Temp 36.7 °C (98 °F) (Temporal)   Resp 17   Wt 87 kg (191 lb 12.8 oz)   SpO2 96%   BMI 30.96 kg/m²  @[email or web address][[id number]::@   Pulse ox interpretation: I interpret this pulse ox as normal.  Constitutional: Alert in no apparent distress.  HENT: No signs of trauma, Bilateral external ears normal, Nose normal.   Eyes: Pupils are equal and reactive, Conjunctiva  normal, Non-icteric.   Neck: Normal range of motion, No tenderness, Supple, No stridor.   Lymphatic: No lymphadenopathy noted.   Cardiovascular: Regular rate and rhythm, no murmurs.   Thorax & Lungs: Normal breath sounds, No respiratory distress, No wheezing, No chest tenderness.   Abdomen: Bowel sounds normal, Soft, No tenderness, No masses, No pulsatile masses. No peritoneal signs.  Skin: Warm, Dry, No erythema, No rash.   Extremities: Intact distal pulses, swelling of the right lower extremity, no erythema, no warmth to touch, no evidence of infection.  Musculoskeletal: Good range of motion in all major joints. No tenderness to palpation or major deformities noted.  Swelling of right lower extremity.  Neurologic: Alert , Normal motor function, Normal sensory function, No focal deficits noted.   Psychiatric: Affect normal, Judgment normal, Mood normal.       DIAGNOSTIC STUDIES / PROCEDURES      RADIOLOGY  US-EXTREMITY VENOUS LOWER UNILAT RIGHT   Final Result        Negative for DVT      COURSE & MEDICAL DECISION MAKING  Pertinent Labs & Imaging studies reviewed. (See chart for details)    The patient presents with swelling of the right lower extremity, ultrasound was ordered to evaluate for DVT.  I offered him pain medicine but he would not like any at this time.    The patient's ultrasound is negative for DVT.  At this point because he has had a previous DVT I would like him to have a repeat study in 1 week for fear of DVT below the knee that cannot be visualized today that propagates.  He'll return for any chest pain or shortness of breath.  He will elevate the legs, use low-salt diet, wear compression stockings.    The patient will return for new or worsening symptoms and is stable at the time of discharge.    The patient is referred to his primary physician for blood pressure management, diabetic screening, and for all other preventative health concerns.        DISPOSITION:  Patient will be discharged home in  stable condition.    FOLLOW UP:  [facility], Emergency Dept  [address]  [place] [id number] [phone]    If symptoms worsen    [facility]  [address] [id number] [phone]      repeat ultrasound 1 week      OUTPATIENT MEDICATIONS:  New Prescriptions    No medications on file         The patient will return for worsening symptoms and is stable at the time of discharge. The patient verbalizes understanding and will comply.    FINAL IMPRESSION  1. Leg swelling                Electronically signed by: Yang Hill M.D., 11/9/2021 10:01 AM

## 2021-11-09 NOTE — ED TRIAGE NOTES
RLE redness, swelling and pain to the calf, noted last night, no noted temps, no sob, no cp.  Prior history of DVT in 2010.

## 2021-11-09 NOTE — DISCHARGE INSTRUCTIONS
Peripheral Edema    Peripheral edema is swelling that is caused by a buildup of fluid. Peripheral edema most often affects the lower legs, ankles, and feet. It can also develop in the arms, hands, and face. The area of the body that has peripheral edema will look swollen. It may also feel heavy or warm. Your clothes may start to feel tight. Pressing on the area may make a temporary dent in your skin. You may not be able to move your swollen arm or leg as much as usual.  There are many causes of peripheral edema. It can happen because of a complication of other conditions such as congestive heart failure, kidney disease, or a problem with your blood circulation. It also can be a side effect of certain medicines or because of an infection. It often happens to women during pregnancy. Sometimes, the cause is not known.  Follow these instructions at home:  Managing pain, stiffness, and swelling    · Raise (elevate) your legs while you are sitting or lying down.  · Move around often to prevent stiffness and to lessen swelling.  · Do not sit or stand for long periods of time.  · Wear support stockings as told by your health care provider.  Medicines  · Take over-the-counter and prescription medicines only as told by your health care provider.  · Your health care provider may prescribe medicine to help your body get rid of excess water (diuretic).  General instructions  · Pay attention to any changes in your symptoms.  · Follow instructions from your health care provider about limiting salt (sodium) in your diet. Sometimes, eating less salt may reduce swelling.  · Moisturize skin daily to help prevent skin from cracking and draining.  · Keep all follow-up visits as told by your health care provider. This is important.  Contact a health care provider if you have:  · A fever.  · Edema that starts suddenly or is getting worse, especially if you are pregnant or have a medical condition.  · Swelling in only one leg.  · Increased  swelling, redness, or pain in one or both of your legs.  · Drainage or sores at the area where you have edema.  Get help right away if you:  · Develop shortness of breath, especially when you are lying down.  · Have pain in your chest or abdomen.  · Feel weak.  · Feel faint.  Summary  · Peripheral edema is swelling that is caused by a buildup of fluid. Peripheral edema most often affects the lower legs, ankles, and feet.  · Move around often to prevent stiffness and to lessen swelling. Do not sit or stand for long periods of time.  · Pay attention to any changes in your symptoms.  · Contact a health care provider if you have edema that starts suddenly or is getting worse, especially if you are pregnant or have a medical condition.  · Get help right away if you develop shortness of breath, especially when lying down.  This information is not intended to replace advice given to you by your health care provider. Make sure you discuss any questions you have with your health care provider.  Document Released: 01/25/2006 Document Revised: 09/11/2019 Document Reviewed: 09/11/2019  Elsevier Patient Education © 2020 [employer]

## 2021-11-15 ENCOUNTER — OFFICE VISIT (OUTPATIENT)
Dept: MEDICAL GROUP | Facility: PHYSICIAN GROUP | Age: 56
End: 2021-11-15
Payer: COMMERCIAL

## 2021-11-15 VITALS
HEIGHT: 67 IN | SYSTOLIC BLOOD PRESSURE: 122 MMHG | WEIGHT: 188 LBS | TEMPERATURE: 98.8 F | HEART RATE: 94 BPM | BODY MASS INDEX: 29.51 KG/M2 | RESPIRATION RATE: 16 BRPM | DIASTOLIC BLOOD PRESSURE: 72 MMHG | OXYGEN SATURATION: 96 %

## 2021-11-15 DIAGNOSIS — M79.661 RIGHT CALF PAIN: ICD-10-CM

## 2021-11-15 DIAGNOSIS — R06.83 SNORING: ICD-10-CM

## 2021-11-15 DIAGNOSIS — R53.83 FATIGUE, UNSPECIFIED TYPE: ICD-10-CM

## 2021-11-15 DIAGNOSIS — E78.00 PURE HYPERCHOLESTEROLEMIA: ICD-10-CM

## 2021-11-15 DIAGNOSIS — Z12.5 SCREENING FOR PROSTATE CANCER: ICD-10-CM

## 2021-11-15 PROCEDURE — 99213 OFFICE O/P EST LOW 20 MIN: CPT | Performed by: NURSE PRACTITIONER

## 2021-11-15 ASSESSMENT — PATIENT HEALTH QUESTIONNAIRE - PHQ9: CLINICAL INTERPRETATION OF PHQ2 SCORE: 0

## 2021-11-15 NOTE — PROGRESS NOTES
"Chief Complaint   Patient presents with   • Hospital Follow-up   • Edema     right calf        Subjective:   Jack Singh is a 56 y.o. male here today for evaluation and management of:    Right calf pain  Reports he had redness, tenderness and swelling, right calf.  Went to ER and US was normal.  ER suggested a repeat study.  Wearing compression stockings and elevating.  Right calf is less swollen.  Has numerous varicose vein.     Snoring  Reports snoring, apnea and extreme daytime sleepiness, sometimes falling asleep at work.           Current medicines (including changes today)  No current outpatient medications on file.     No current facility-administered medications for this visit.     He  has a past medical history of Allergy and Eczema.    ROS as stated in hpi  No chest pain, no shortness of breath, no abdominal pain       Objective:     /72   Pulse 94   Temp 37.1 °C (98.8 °F) (Temporal)   Resp 16   Ht 1.702 m (5' 7\")   Wt 85.3 kg (188 lb)   SpO2 96%  Body mass index is 29.44 kg/m².   Physical Exam:  Constitutional: Alert, no distress.  Skin: Warm, dry, good turgor,no cyanosis, no rashes in visible areas.  Eye: Equal, round and reactive, conjunctiva clear, lids normal.  MSK:  Right calf larger than left, no redness or pain.  Numerous varicose veing noted in right lower calf.   Neuro: Cranial nerves 2-12 grossly intact.  No sensory deficit.  Respiratory: Unlabored respiratory effort, lungs clear to auscultation, no wheezes, no ronchi.  Cardiovascular: Normal S1, S2, no murmur, no edema.  Abdomen: Soft, non-tender, no masses, no guarding,  no hepatosplenomegaly.  Psych: Alert and oriented x3, normal affect and mood and judgement.        Assessment and Plan:   The following treatment plan was discussed    1. Right calf pain  Acute, seen in ER.  Follow up US requested by ER.  US ordered.  Elevated, compression, red flags reviewed.  No chest pain, shortness of breath reported.   - US-EXTREMITY " VENOUS LOWER UNILAT RIGHT; Future    2. Screening for prostate cancer  Due for screening.   - PROSTATE SPECIFIC AG SCREENING; Future    3. Pure hypercholesterolemia  Due for labs.   - CBC WITH DIFFERENTIAL; Future  - Comp Metabolic Panel; Future  - Lipid Profile; Future    4. Snoring  Acute new issue.  Snoring with apnea.  Refer for sleep consultations.  Monitor.   - Referral to Pulmonary and Sleep Medicine    5. Fatigue, unspecified type  See #4  - TSH; Future  - FREE THYROXINE; Future      Followup: No follow-ups on file.         Educated in proper administration of medication(s) ordered today including safety, possible SE, risks, benefits, rationale and alternatives to therapy.     Please note that this dictation was created using voice recognition software. I have made every reasonable attempt to correct obvious errors, but I expect that there are errors of grammar and possibly content that I did not discover before finalizing the note.

## 2021-11-15 NOTE — ASSESSMENT & PLAN NOTE
Reports he had redness, tenderness and swelling, right calf.  Went to ER and US was normal.  ER suggested a repeat study.  Wearing compression stockings and elevating.  Right calf is less swollen.  Has numerous varicose vein.

## 2021-12-08 ENCOUNTER — HOSPITAL ENCOUNTER (OUTPATIENT)
Dept: RADIOLOGY | Facility: MEDICAL CENTER | Age: 56
End: 2021-12-08
Attending: NURSE PRACTITIONER
Payer: COMMERCIAL

## 2021-12-08 DIAGNOSIS — M79.661 RIGHT CALF PAIN: ICD-10-CM

## 2021-12-08 PROCEDURE — 93971 EXTREMITY STUDY: CPT | Mod: RT

## 2022-01-18 ENCOUNTER — APPOINTMENT (OUTPATIENT)
Dept: SLEEP MEDICINE | Facility: MEDICAL CENTER | Age: 57
End: 2022-01-18
Payer: COMMERCIAL

## 2022-06-06 ENCOUNTER — OFFICE VISIT (OUTPATIENT)
Dept: URGENT CARE | Facility: PHYSICIAN GROUP | Age: 57
End: 2022-06-06
Payer: COMMERCIAL

## 2022-06-06 VITALS
OXYGEN SATURATION: 99 % | SYSTOLIC BLOOD PRESSURE: 126 MMHG | BODY MASS INDEX: 26.36 KG/M2 | RESPIRATION RATE: 18 BRPM | DIASTOLIC BLOOD PRESSURE: 78 MMHG | WEIGHT: 164 LBS | HEIGHT: 66 IN | TEMPERATURE: 98.1 F | HEART RATE: 95 BPM

## 2022-06-06 DIAGNOSIS — S46.911A STRAIN OF RIGHT SHOULDER, INITIAL ENCOUNTER: ICD-10-CM

## 2022-06-06 PROCEDURE — 99213 OFFICE O/P EST LOW 20 MIN: CPT | Performed by: PHYSICIAN ASSISTANT

## 2022-06-06 ASSESSMENT — PAIN SCALES - GENERAL: PAINLEVEL: 8=MODERATE-SEVERE PAIN
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
## 2022-06-23 ENCOUNTER — APPOINTMENT (OUTPATIENT)
Dept: RADIOLOGY | Facility: IMAGING CENTER | Age: 57
End: 2022-06-23
Attending: FAMILY MEDICINE
Payer: COMMERCIAL

## 2022-06-23 ENCOUNTER — OFFICE VISIT (OUTPATIENT)
Dept: SPORTS MEDICINE | Facility: CLINIC | Age: 57
End: 2022-06-23
Payer: COMMERCIAL

## 2022-06-23 VITALS
WEIGHT: 164 LBS | BODY MASS INDEX: 26.36 KG/M2 | OXYGEN SATURATION: 96 % | HEART RATE: 86 BPM | HEIGHT: 66 IN | SYSTOLIC BLOOD PRESSURE: 118 MMHG | RESPIRATION RATE: 18 BRPM | DIASTOLIC BLOOD PRESSURE: 80 MMHG | TEMPERATURE: 97.4 F

## 2022-06-23 DIAGNOSIS — M54.12 CERVICAL RADICULOPATHY AT C6: ICD-10-CM

## 2022-06-23 DIAGNOSIS — S49.91XA SHOULDER INJURY, RIGHT, INITIAL ENCOUNTER: ICD-10-CM

## 2022-06-23 DIAGNOSIS — M25.511 ACUTE PAIN OF RIGHT SHOULDER: ICD-10-CM

## 2022-06-23 PROCEDURE — 73030 X-RAY EXAM OF SHOULDER: CPT | Mod: TC,RT | Performed by: FAMILY MEDICINE

## 2022-06-23 PROCEDURE — 72040 X-RAY EXAM NECK SPINE 2-3 VW: CPT | Mod: TC | Performed by: FAMILY MEDICINE

## 2022-06-23 PROCEDURE — 99214 OFFICE O/P EST MOD 30 MIN: CPT | Performed by: FAMILY MEDICINE
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
## 2022-07-08 ENCOUNTER — HOSPITAL ENCOUNTER (OUTPATIENT)
Dept: RADIOLOGY | Facility: MEDICAL CENTER | Age: 57
End: 2022-07-08
Attending: FAMILY MEDICINE
Payer: COMMERCIAL

## 2022-07-08 DIAGNOSIS — S49.91XA SHOULDER INJURY, RIGHT, INITIAL ENCOUNTER: ICD-10-CM

## 2022-07-08 DIAGNOSIS — M25.511 ACUTE PAIN OF RIGHT SHOULDER: ICD-10-CM

## 2022-07-08 PROCEDURE — 73221 MRI JOINT UPR EXTREM W/O DYE: CPT | Mod: RT

## 2022-07-11 ENCOUNTER — OFFICE VISIT (OUTPATIENT)
Dept: SPORTS MEDICINE | Facility: CLINIC | Age: 57
End: 2022-07-11
Payer: COMMERCIAL

## 2022-07-11 VITALS — BODY MASS INDEX: 26.36 KG/M2 | HEIGHT: 66 IN | WEIGHT: 164 LBS

## 2022-07-11 DIAGNOSIS — S49.91XD SHOULDER INJURY, RIGHT, SUBSEQUENT ENCOUNTER: ICD-10-CM

## 2022-07-11 DIAGNOSIS — S43.431A LABRAL TEAR OF SHOULDER, RIGHT, INITIAL ENCOUNTER: ICD-10-CM

## 2022-07-11 DIAGNOSIS — S46.811A TRAUMATIC TEAR OF SUPRASPINATUS TENDON OF RIGHT SHOULDER, INITIAL ENCOUNTER: ICD-10-CM

## 2022-07-11 PROCEDURE — 99213 OFFICE O/P EST LOW 20 MIN: CPT | Performed by: FAMILY MEDICINE

## 2022-07-11 NOTE — PROGRESS NOTES
Chief Complaint   Patient presents with   • Shoulder Pain     F/V MRI results        1. Traumatic tear of supraspinatus tendon of right shoulder, initial encounter  Referral to Orthopedics   2. Labral tear of shoulder, right, initial encounter  Referral to Orthopedics   3. Shoulder injury, right, subsequent encounter  Referral to Orthopedics     Patient is here to follow-up for his MRI results   still having significant pain and difficulty with the RIGHT shoulder  Associated with painful popping    Ever since his injury has had persistent weakness and painful popping/clicking    Referral for orthopedic consultation and consideration of arthroscopic intervention        7/8/2022 8:16 AM     HISTORY/REASON FOR EXAM:  Right-sided shoulder pain. Limited range of motion.     TECHNIQUE/EXAM DESCRIPTION:  MRI of the RIGHT shoulder without contrast.     Using a Bill 1.5 Eleni MRI scanner, T1 sagittal, fast spin-echo T2 fat-suppressed oblique coronal, sagittal, and axial and intermediate fast spin-echo oblique coronal images were obtained.     COMPARISON: None.     FINDINGS:     Osseous acromial outlet: The acromion demonstrates a curved undersurface. There is no lateral downsloping.  There is no evidence of an inferiorly directed subacromial enthesophyte.  There is prominent degenerative change of the acromioclavicular joint. There are small inferiorly directed osteophytes.  There is a moderate amount of fluid seen within the subacromial/subdeltoid bursa.     Rotator cuff: There is complete full-thickness supraspinatus tendon with tendon delamination and 5 cm of tendon retraction. There is mild edema involving the muscle. There is full-thickness tear involving the superior aspect of the subscapularis tendon   with retraction to the bony glenoid. There is mild muscle edema.     Glenoid labrum: There is tear of the superior and posterior/superior glenoid labrum. The biceps anchor is intact. The anterior/inferior and  posterior/inferior glenoid labrum are intact.     Osseous structures/Cartilaginous surfaces: There is marginal cystic change involving the humeral head. No evidence of fracture or cartilage injury. There is prominence of the lesser tuberosity with narrowing of the coracohumeral distance.     Miscellaneous: There is a small-to-moderate joint effusion. The long head of the biceps tendon is appropriately situated within the bicipital groove.     IMPRESSION:     1.  Complete full-thickness tear of the supraspinatus tendon with 5 cm of retraction and tendon delamination. There is mild muscle edema.     2.  Full-thickness tear of the superior aspect of the subscapularis tendon with retraction to the bony glenoid. There is mild muscle edema. There is also some prominence of the lesser tuberosity with narrowing of the coracohumeral distance. These   findings can be seen in the clinical setting of subcoracoid impingement.     3.  Tear of the superior and posterior/superior glenoid labrum. The biceps anchor is intact.     4.  Degenerative change of the acromioclavicular joint with small inferiorly directed osteophytes.     5.  Moderate amount of fluid within the subacromial/subdeltoid bursa.     6.  Small to moderate joint effusion.             Exam Ended: 07/08/22  8:35 AM Last Resulted: 07/08/22  9:37 AM           Interpreted in the office today with the patient

## 2022-07-22 ENCOUNTER — PATIENT MESSAGE (OUTPATIENT)
Dept: SPORTS MEDICINE | Facility: CLINIC | Age: 57
End: 2022-07-22
Payer: COMMERCIAL

## 2022-07-22 DIAGNOSIS — S43.431A LABRAL TEAR OF SHOULDER, RIGHT, INITIAL ENCOUNTER: ICD-10-CM

## 2022-07-22 DIAGNOSIS — S46.811A TRAUMATIC TEAR OF SUPRASPINATUS TENDON OF RIGHT SHOULDER, INITIAL ENCOUNTER: ICD-10-CM

## 2022-07-22 DIAGNOSIS — S49.91XD SHOULDER INJURY, RIGHT, SUBSEQUENT ENCOUNTER: ICD-10-CM

## 2022-07-25 NOTE — PROGRESS NOTES
1. Traumatic tear of supraspinatus tendon of right shoulder, initial encounter  Referral to Orthopedics   2. Labral tear of shoulder, right, initial encounter  Referral to Orthopedics   3. Shoulder injury, right, subsequent encounter  Referral to Orthopedics